# Patient Record
Sex: FEMALE | Race: WHITE | NOT HISPANIC OR LATINO | Employment: UNEMPLOYED | URBAN - METROPOLITAN AREA
[De-identification: names, ages, dates, MRNs, and addresses within clinical notes are randomized per-mention and may not be internally consistent; named-entity substitution may affect disease eponyms.]

---

## 2022-07-08 ENCOUNTER — OFFICE VISIT (OUTPATIENT)
Dept: ENDOCRINOLOGY | Facility: CLINIC | Age: 22
End: 2022-07-08

## 2022-07-08 DIAGNOSIS — O24.419 GESTATIONAL DIABETES MELLITUS (GDM), ANTEPARTUM, GESTATIONAL DIABETES METHOD OF CONTROL UNSPECIFIED: ICD-10-CM

## 2022-07-08 PROCEDURE — G0108 DIAB MANAGE TRN  PER INDIV: HCPCS | Performed by: DIETITIAN, REGISTERED

## 2022-07-08 NOTE — PROGRESS NOTES
Pt attended ind. GDM class for 2 hours. See edu rec 1 or 2. Pt was able to use her Accu-Chek BG meter to obtain a BG of 99. Pt will continue to follow with MD.

## 2022-07-26 ENCOUNTER — TELEPHONE (OUTPATIENT)
Dept: ENDOCRINOLOGY | Facility: CLINIC | Age: 22
End: 2022-07-26